# Patient Record
Sex: FEMALE | Race: ASIAN | NOT HISPANIC OR LATINO | URBAN - METROPOLITAN AREA
[De-identification: names, ages, dates, MRNs, and addresses within clinical notes are randomized per-mention and may not be internally consistent; named-entity substitution may affect disease eponyms.]

---

## 2021-09-10 ENCOUNTER — EMERGENCY (EMERGENCY)
Facility: HOSPITAL | Age: 66
LOS: 1 days | Discharge: HOME | End: 2021-09-10
Attending: EMERGENCY MEDICINE
Payer: MEDICARE

## 2021-09-10 VITALS
HEIGHT: 64 IN | SYSTOLIC BLOOD PRESSURE: 189 MMHG | TEMPERATURE: 98 F | DIASTOLIC BLOOD PRESSURE: 118 MMHG | WEIGHT: 154.1 LBS | HEART RATE: 89 BPM | OXYGEN SATURATION: 98 % | RESPIRATION RATE: 18 BRPM

## 2021-09-10 PROCEDURE — 93010 ELECTROCARDIOGRAM REPORT: CPT

## 2021-09-10 PROCEDURE — 99053 MED SERV 10PM-8AM 24 HR FAC: CPT

## 2021-09-10 PROCEDURE — 99284 EMERGENCY DEPT VISIT MOD MDM: CPT

## 2021-09-11 VITALS
TEMPERATURE: 98 F | RESPIRATION RATE: 18 BRPM | DIASTOLIC BLOOD PRESSURE: 102 MMHG | OXYGEN SATURATION: 100 % | SYSTOLIC BLOOD PRESSURE: 176 MMHG | HEART RATE: 71 BPM

## 2021-09-11 PROCEDURE — 71045 X-RAY EXAM CHEST 1 VIEW: CPT

## 2021-09-11 PROCEDURE — 73030 X-RAY EXAM OF SHOULDER: CPT

## 2021-09-11 PROCEDURE — 99284 EMERGENCY DEPT VISIT MOD MDM: CPT | Mod: 25

## 2021-09-11 PROCEDURE — 71045 X-RAY EXAM CHEST 1 VIEW: CPT | Mod: 26

## 2021-09-11 PROCEDURE — 73030 X-RAY EXAM OF SHOULDER: CPT | Mod: 26,LT

## 2021-09-11 PROCEDURE — 73060 X-RAY EXAM OF HUMERUS: CPT

## 2021-09-11 PROCEDURE — 93005 ELECTROCARDIOGRAM TRACING: CPT

## 2021-09-11 PROCEDURE — 73060 X-RAY EXAM OF HUMERUS: CPT | Mod: 26,LT

## 2021-09-11 RX ORDER — TETANUS TOXOID, REDUCED DIPHTHERIA TOXOID AND ACELLULAR PERTUSSIS VACCINE, ADSORBED 5; 2.5; 8; 8; 2.5 [IU]/.5ML; [IU]/.5ML; UG/.5ML; UG/.5ML; UG/.5ML
0.5 SUSPENSION INTRAMUSCULAR ONCE
Refills: 0 | Status: DISCONTINUED | OUTPATIENT
Start: 2021-09-11 | End: 2021-09-14

## 2021-09-11 RX ORDER — ACETAMINOPHEN 500 MG
975 TABLET ORAL ONCE
Refills: 0 | Status: DISCONTINUED | OUTPATIENT
Start: 2021-09-11 | End: 2021-09-14

## 2021-09-11 RX ORDER — OXYCODONE HYDROCHLORIDE 5 MG/1
10 TABLET ORAL ONCE
Refills: 0 | Status: DISCONTINUED | OUTPATIENT
Start: 2021-09-11 | End: 2021-09-11

## 2021-09-11 RX ADMIN — OXYCODONE HYDROCHLORIDE 10 MILLIGRAM(S): 5 TABLET ORAL at 01:32

## 2021-09-11 NOTE — ED PROVIDER NOTE - NSFOLLOWUPINSTRUCTIONS_ED_ALL_ED_FT
You were seen in the Emergency Department today for a left shoulder dislocation. X-ray imaging confirmed this diagnosis. You were provided pain management with Tylenol and Oxycodone. There was a spontaneous reduction of you left shoulder, no procedure was performed. Your arm was placed in a sling and repeat x-rays confirmed appropriate shoulder reduction. It is recommended that you see an Orthopedic Surgeon for follow-up care and to evaluate for a possible ligamentous injury. A list of providers have been provided to you.     Please follow with your primary care provider. Take home medication as prescribed.     Please return to the Emergency Department if you experience re-dislocation of your shoulder or any of the following symptoms: severe pain, fever, chills, chest pain, palpitations, nausea, vomiting, or any other alarming symptoms.      Your shoulder joint is made up of 3 bones:    The upper arm bone (humerus).  The shoulder blade (scapula).  The collarbone (clavicle).    A shoulder dislocation happens when your upper arm bone moves out of its normal place in your shoulder joint.      Follow these instructions at home:  If you have a splint or sling:    Wear it as told by your doctor.  Take it off only as told by your doctor.  Loosen it if:  Your fingers become numb and tingly.  Your fingers turn cold and blue.  Keep it clean and dry.    Bathing    Do not take baths, swim, or use a hot tub until your doctor says you can. Ask your doctor if you can take showers. You may only be allowed to take sponge baths.  If your doctor says taking baths or showers is okay, cover your splint or sling with a plastic bag. Do not let the splint or sling get wet.    Managing pain, stiffness, and swelling    If told, put ice on the injured area.  Put ice in a plastic bag.  Place a towel between your skin and the bag.  Leave the ice on for 20 minutes, 2–3 times per day.  Move your fingers often to avoid stiffness and to lessen swelling.  Raise (elevate) the injured area above the level of your heart while you are sitting or lying down.     Driving    Do not drive while you are wearing a splint or sling on a hand that you use for driving.  Do not drive or operate heavy machinery while taking pain medicine.    Activity    Return to your normal activities as told by your doctor. Ask your doctor what activities are safe for you.  Do range-of-motion exercises only as told by your doctor.  Exercise your hand by squeezing a soft ball. This keeps your hand and wrist from getting stiff and swollen.    General instructions    Take over-the-counter and prescription medicines only as told by your doctor.  Do not use any tobacco products, including cigarettes, chewing tobacco, or e-cigarettes. Tobacco can slow down healing. If you need help quitting, ask your doctor.  Keep all follow-up visits as told by your doctor. This is important.    Contact a doctor if:  Your splint or sling gets damaged.    Get help right away if:  Your pain gets worse instead of better.  You lose feeling in your arm or hand.  Your arm or hand turns white and cold.    ADDITIONAL NOTES AND INSTRUCTIONS    Please follow up with your Primary MD in 24-48 hr.  Seek immediate medical care for any new/worsening signs or symptoms.

## 2021-09-11 NOTE — ED PROVIDER NOTE - ATTENDING CONTRIBUTION TO CARE
Attending note (Adam):  67 y/o F with h/o borderline HLD, c/o left shoulder pain after falling down 3 steps; L shoulder deformity - suspect proximal humerus fracture vs dislocation; will obtain xrays, offer pain medication; lip laceration will need primary closure, offer tetanus vaccination.    Addendum: spontaneously reduced shoulder. declined suturing of lip laceration (will offer steri strip but educated patient on delayed healing, likely worse cosmesis; patient verbalized understanding); stable for dc with recommendation to see ortho as outpatient.

## 2021-09-11 NOTE — ED ADULT NURSE NOTE - NSIMPLEMENTINTERV_GEN_ALL_ED
Implemented All Fall Risk Interventions:  [place] to call system. Call bell, personal items and telephone within reach. Instruct patient to call for assistance. Room bathroom lighting operational. Non-slip footwear when patient is off stretcher. Physically safe environment: no spills, clutter or unnecessary equipment. Stretcher in lowest position, wheels locked, appropriate side rails in place. Provide visual cue, wrist band, yellow gown, etc. Monitor gait and stability. Monitor for mental status changes and reorient to person, place, and time. Review medications for side effects contributing to fall risk. Reinforce activity limits and safety measures with patient and family.

## 2021-09-11 NOTE — ED PROVIDER NOTE - NS ED ROS FT
Review of Systems:  -General: no fever  -ENT: no congestion  -Pulmonary: no cough, no shortness of breath  -Cardiac: no chest pain  -Gastrointestinal: no abdominal pain  -Musculoskeletal: no back or neck pain; + L shoulder pain; + chronic knee pain (unchagned);  denies hip pain.  -Skin: no rashes  -Endocrine: No h/o diabetes or thyroid disease

## 2021-09-11 NOTE — ED PROVIDER NOTE - PHYSICAL EXAMINATION
On Physical Exam:  General: well appearing, in NAD, speaking clearly in full sentences and without difficulty; cooperative with exam  HEENT: airway patent  Neck: no neck tenderness, no nuchal rigidity  Cardiac: normal s1, s2; RRR; no MGR  Lungs: CTABL  Abdomen: soft nontender/nondistended  Skin: lip laceration to lower lip / left side, < 0.5cm, superficial, extending to just at the vermillion border (vertical lac)  Extremities:   -RUE: no gross deformities, nontender, FROM, radial pulse 2+  -LUE: obvious deformity to the left shoulder; limited ROM of shoulder due to pain; elbow, wrist/hand nontender no deformity; radial pulse 2+  -hips nontender, ambulating normally without difficulty  Neuro: no gross neurologic deficits; full sensation throughout left upper extremity

## 2021-09-11 NOTE — ED ADULT NURSE NOTE - OBJECTIVE STATEMENT
66y female, AAOx3, denies PMH, complaining of 10/10 pain to left shoulder and arm, pt reports tripping up stairs, no LOC, fell on left arm, constant pain since then, also reports lip LAC, no headache, dizziness, changes in vision, moves all extremities w/+ pulses, bed in lowest position, comfort and safety provided.

## 2021-09-11 NOTE — ED PROVIDER NOTE - TOBACCO USE
Never smoker
You can access the [employer] Patient Portal, offered by [facility], by registering with the following website: [email or web address]
No

## 2021-09-11 NOTE — ED PROVIDER NOTE - PROGRESS NOTE DETAILS
Attending note (Adam): patient went to restroom in preparation for procedural sedation to reduce dislocated shoulder and reports feeling pop when using L UE to pull up pants, now has no pain; on repeat exam, shoulder appears without deformity. Suspect spontaneous reduction; will obtain xrays to confirm. Mitch PGY 1: Spontaneous reduction of left shoulder when patient went to bathroom and lifted her pants up. Will get repeat x-ray. Recommended to patient that she follow-up w/ Ortho to evaluate for ligamentous injury to left shoulder. Cleaned punctuate wound on lower lip. Per our examination, laceration requires 1-2 sutures for closure. Informed patient and she declines suture placement. Verbalized the risks of not repairing laceration in ED including infection. Patient states she understands and still declines suture placement. Mitch PGY 1: Spontaneous reduction of left shoulder when patient went to bathroom and lifted her pants up. Will get repeat x-ray. Recommended to patient that she follow-up w/ Ortho to evaluate for ligamentous injury to left shoulder. Cleaned punctuate wound on lower lip. Per our examination, laceration requires 1-2 sutures for closure. Informed patient and she declines suture placement. Verbalized the risks of not repairing laceration in ED including infection. Patient states she understands and still declines suture placement. Return precautions discussed. Steri-strip placed on lac.

## 2021-09-11 NOTE — ED PROVIDER NOTE - PATIENT PORTAL LINK FT
You can access the FollowMyHealth Patient Portal offered by [facility] by registering at the following website: [email or web address]. By joining [employer]’s FollowMyHealth portal, you will also be able to view your health information using other applications (apps) compatible with our system.

## 2021-09-11 NOTE — ED PROVIDER NOTE - OBJECTIVE STATEMENT
Attending note (Adam): 67 y/o F with h/o borderline HLD, c/o left shoulder pain after falling down 3 steps: Denies head trauma. Pt states fell onto left side; c/o pain in the left shoulder; improved with holding forearm up.  After fall was able to get up, walked back up stairs and found , who brought her to ED for evaluation.  unknown last tetanus vaccination.    PMH: HLD  Meds: none; denies ASA/AC use  Allergies: NKDA  Nonsmoker

## 2021-09-11 NOTE — ED PROVIDER NOTE - CLINICAL SUMMARY MEDICAL DECISION MAKING FREE TEXT BOX
Attending note (Adam):  65 y/o F with h/o borderline HLD, c/o left shoulder pain after falling down 3 steps; L shoulder deformity - suspect proximal humerus fracture vs dislocation; will obtain xrays, offer pain medication; lip laceration will need primary closure, offer tetanus vaccination Attending note (Adam):  65 y/o F with h/o borderline HLD, c/o left shoulder pain after falling down 3 steps; L shoulder deformity - suspect proximal humerus fracture vs dislocation; will obtain xrays, offer pain medication; lip laceration will need primary closure, offer tetanus vaccination    Mitch PGY1: 65 y/o who PTED after fall down 3 steps. Denies head injury or LOC. C/o Left shoulder pain. Concern for L shoulder dislocation vs humerus fracture. small punctuate on lower  lip laceration near left vermillion border. Xray of shoulder and humerus. chest xray. Pain control with oxy and tylenol. Unknown tetanus. Will repair lip lac.

## 2022-03-14 ENCOUNTER — EMERGENCY (EMERGENCY)
Facility: HOSPITAL | Age: 67
LOS: 1 days | Discharge: HOME | End: 2022-03-14
Attending: EMERGENCY MEDICINE | Admitting: EMERGENCY MEDICINE
Payer: MEDICARE

## 2022-03-14 VITALS
HEART RATE: 100 BPM | DIASTOLIC BLOOD PRESSURE: 89 MMHG | RESPIRATION RATE: 18 BRPM | TEMPERATURE: 98 F | OXYGEN SATURATION: 100 % | HEIGHT: 64 IN | SYSTOLIC BLOOD PRESSURE: 167 MMHG

## 2022-03-14 VITALS
DIASTOLIC BLOOD PRESSURE: 87 MMHG | HEART RATE: 76 BPM | TEMPERATURE: 98 F | RESPIRATION RATE: 16 BRPM | SYSTOLIC BLOOD PRESSURE: 163 MMHG | OXYGEN SATURATION: 100 %

## 2022-03-14 LAB
ALBUMIN SERPL ELPH-MCNC: 4.7 G/DL — SIGNIFICANT CHANGE UP (ref 3.3–5)
ALP SERPL-CCNC: 118 U/L — SIGNIFICANT CHANGE UP (ref 40–120)
ALT FLD-CCNC: 10 U/L — SIGNIFICANT CHANGE UP (ref 4–33)
ANION GAP SERPL CALC-SCNC: 12 MMOL/L — SIGNIFICANT CHANGE UP (ref 7–14)
APPEARANCE UR: CLEAR — SIGNIFICANT CHANGE UP
AST SERPL-CCNC: 16 U/L — SIGNIFICANT CHANGE UP (ref 4–32)
BASOPHILS # BLD AUTO: 0.05 K/UL — SIGNIFICANT CHANGE UP (ref 0–0.2)
BASOPHILS NFR BLD AUTO: 1.1 % — SIGNIFICANT CHANGE UP (ref 0–2)
BILIRUB SERPL-MCNC: 0.4 MG/DL — SIGNIFICANT CHANGE UP (ref 0.2–1.2)
BILIRUB UR-MCNC: NEGATIVE — SIGNIFICANT CHANGE UP
BUN SERPL-MCNC: 20 MG/DL — SIGNIFICANT CHANGE UP (ref 7–23)
CALCIUM SERPL-MCNC: 9.9 MG/DL — SIGNIFICANT CHANGE UP (ref 8.4–10.5)
CHLORIDE SERPL-SCNC: 104 MMOL/L — SIGNIFICANT CHANGE UP (ref 98–107)
CO2 SERPL-SCNC: 26 MMOL/L — SIGNIFICANT CHANGE UP (ref 22–31)
COLOR SPEC: YELLOW — SIGNIFICANT CHANGE UP
CREAT SERPL-MCNC: 0.96 MG/DL — SIGNIFICANT CHANGE UP (ref 0.5–1.3)
DIFF PNL FLD: NEGATIVE — SIGNIFICANT CHANGE UP
EGFR: 64 ML/MIN/1.73M2 — SIGNIFICANT CHANGE UP
EOSINOPHIL # BLD AUTO: 0.32 K/UL — SIGNIFICANT CHANGE UP (ref 0–0.5)
EOSINOPHIL NFR BLD AUTO: 6.8 % — HIGH (ref 0–6)
GLUCOSE SERPL-MCNC: 80 MG/DL — SIGNIFICANT CHANGE UP (ref 70–99)
GLUCOSE UR QL: NEGATIVE — SIGNIFICANT CHANGE UP
HCT VFR BLD CALC: 39.1 % — SIGNIFICANT CHANGE UP (ref 34.5–45)
HGB BLD-MCNC: 12.9 G/DL — SIGNIFICANT CHANGE UP (ref 11.5–15.5)
IANC: 2.5 K/UL — SIGNIFICANT CHANGE UP (ref 1.5–8.5)
IMM GRANULOCYTES NFR BLD AUTO: 0.4 % — SIGNIFICANT CHANGE UP (ref 0–1.5)
KETONES UR-MCNC: NEGATIVE — SIGNIFICANT CHANGE UP
LEUKOCYTE ESTERASE UR-ACNC: ABNORMAL
LIDOCAIN IGE QN: 35 U/L — SIGNIFICANT CHANGE UP (ref 7–60)
LYMPHOCYTES # BLD AUTO: 1.47 K/UL — SIGNIFICANT CHANGE UP (ref 1–3.3)
LYMPHOCYTES # BLD AUTO: 31 % — SIGNIFICANT CHANGE UP (ref 13–44)
MCHC RBC-ENTMCNC: 30.1 PG — SIGNIFICANT CHANGE UP (ref 27–34)
MCHC RBC-ENTMCNC: 33 GM/DL — SIGNIFICANT CHANGE UP (ref 32–36)
MCV RBC AUTO: 91.1 FL — SIGNIFICANT CHANGE UP (ref 80–100)
MONOCYTES # BLD AUTO: 0.38 K/UL — SIGNIFICANT CHANGE UP (ref 0–0.9)
MONOCYTES NFR BLD AUTO: 8 % — SIGNIFICANT CHANGE UP (ref 2–14)
NEUTROPHILS # BLD AUTO: 2.5 K/UL — SIGNIFICANT CHANGE UP (ref 1.8–7.4)
NEUTROPHILS NFR BLD AUTO: 52.7 % — SIGNIFICANT CHANGE UP (ref 43–77)
NITRITE UR-MCNC: NEGATIVE — SIGNIFICANT CHANGE UP
NRBC # BLD: 0 /100 WBCS — SIGNIFICANT CHANGE UP
NRBC # FLD: 0 K/UL — SIGNIFICANT CHANGE UP
PH UR: 5.5 — SIGNIFICANT CHANGE UP (ref 5–8)
PLATELET # BLD AUTO: 290 K/UL — SIGNIFICANT CHANGE UP (ref 150–400)
POTASSIUM SERPL-MCNC: 4.4 MMOL/L — SIGNIFICANT CHANGE UP (ref 3.5–5.3)
POTASSIUM SERPL-SCNC: 4.4 MMOL/L — SIGNIFICANT CHANGE UP (ref 3.5–5.3)
PROT SERPL-MCNC: 7.8 G/DL — SIGNIFICANT CHANGE UP (ref 6–8.3)
PROT UR-MCNC: ABNORMAL
RBC # BLD: 4.29 M/UL — SIGNIFICANT CHANGE UP (ref 3.8–5.2)
RBC # FLD: 13.7 % — SIGNIFICANT CHANGE UP (ref 10.3–14.5)
SODIUM SERPL-SCNC: 142 MMOL/L — SIGNIFICANT CHANGE UP (ref 135–145)
SP GR SPEC: 1.03 — SIGNIFICANT CHANGE UP (ref 1–1.05)
UROBILINOGEN FLD QL: SIGNIFICANT CHANGE UP
WBC # BLD: 4.74 K/UL — SIGNIFICANT CHANGE UP (ref 3.8–10.5)
WBC # FLD AUTO: 4.74 K/UL — SIGNIFICANT CHANGE UP (ref 3.8–10.5)

## 2022-03-14 PROCEDURE — 99284 EMERGENCY DEPT VISIT MOD MDM: CPT

## 2022-03-14 PROCEDURE — 71046 X-RAY EXAM CHEST 2 VIEWS: CPT | Mod: 26

## 2022-03-14 PROCEDURE — 74018 RADEX ABDOMEN 1 VIEW: CPT | Mod: 26

## 2022-03-14 RX ORDER — ACETAMINOPHEN 500 MG
975 TABLET ORAL ONCE
Refills: 0 | Status: COMPLETED | OUTPATIENT
Start: 2022-03-14 | End: 2022-03-14

## 2022-03-14 RX ADMIN — Medication 975 MILLIGRAM(S): at 09:37

## 2022-03-14 RX ADMIN — Medication 30 MILLILITER(S): at 09:37

## 2022-03-14 NOTE — ED PROVIDER NOTE - PROGRESS NOTE DETAILS
Pt was reassessed at this time and labs and imaging reviewed. Labs are unactionable. No leukocytosis, Lytes are unremarkable. UA neg for infection. XR abd is normal per rads. CXR is normal per my read except for some likely age related degeneration of her spine but no acute fx and pt has no back pain. Abd pain likely gas vs constipation. Will discharge at this time to have pt f/u with PMD.

## 2022-03-14 NOTE — ED PROVIDER NOTE - OBJECTIVE STATEMENT
68 yo F with HLD that presents with abd pain. Abd pain, left sided but diffuse, described as discomfort, dull/achy. Started 2 days ago, continues, intermittent. Has not had any surgery in abd/pelv in life, no associated symptoms including NO N/V/D, fever, chills, cough, urinary problems, BM problems. NO trauma, no rashes. Pt has been otherwise able to eat and drink normally. She took Advil 200 mg daily x 2 days with relief of pain but the pain comes back so she came to ED for eval. Pt does state that when she drank hot liquid the other day she had more belching and gas than normal but otherwise no issues usually.

## 2022-03-14 NOTE — ED ADULT NURSE NOTE - NSIMPLEMENTINTERV_GEN_ALL_ED
Pt here for pe/pap.   LMP:12/31/20  Last pap:unable to recall   Last mammogram;8/29/16 negative   Birth control:Vasectomy Implemented All Universal Safety Interventions:  [place] to call system. Call bell, personal items and telephone within reach. Instruct patient to call for assistance. Room bathroom lighting operational. Non-slip footwear when patient is off stretcher. Physically safe environment: no spills, clutter or unnecessary equipment. Stretcher in lowest position, wheels locked, appropriate side rails in place.

## 2022-03-14 NOTE — ED ADULT NURSE NOTE - OBJECTIVE STATEMENT
A&Ox4, PMHx HLD on simvastatin, c/o diffuse abdominal pain since Saturday but denies n/v/d, afebrile. Respirations are even and unlabored, sating at 100% on RA, VSS, 20 G to R AC placed, labs sent. Assessment ongoing.

## 2022-03-14 NOTE — ED ADULT TRIAGE NOTE - CHIEF COMPLAINT QUOTE
pt c/o intermittent generalized abd pain x 3 days. denies n/v/d/fever/chills. reports partial relief with 200mg of motrin.

## 2022-03-14 NOTE — ED PROVIDER NOTE - PATIENT PORTAL LINK FT
You can access the FollowMyHealth Patient Portal offered by [facility] by registering at the following website: [email or web address]. By joining [employer]’s FollowMyHealth portal, you will also be able to view your health information using other applications (apps) compatible with our system.

## 2022-03-14 NOTE — ED PROVIDER NOTE - CLINICAL SUMMARY MEDICAL DECISION MAKING FREE TEXT BOX
68 yo F with HLD that presents with diffuse abd discomfort x 2 days which is temporarily relieved by advil x 2 days. Pt has benign exam, abd is soft nontender, no masses, no CVAT. Pt denies fever, chills, N/V/D, and HAS been eating normally since onset pain. NO urinary symptoms and no rash found on exam. May be early shingles but pt has no lesions now. Clinically, pt has no indication for CT imaging at this time. Will obtain labs to check lytes and lipase and LFTs but will provide medications including simethicone which can treat gas pains and reassess but likely discharge home to f/u with PMD which she has not done yet.

## 2022-03-15 LAB
CULTURE RESULTS: SIGNIFICANT CHANGE UP
SPECIMEN SOURCE: SIGNIFICANT CHANGE UP

## 2023-05-01 NOTE — ED ADULT NURSE NOTE - ISOLATION TYPE:
nAh and Liza make my life worth living.  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  None

## 2023-06-08 NOTE — ED ADULT NURSE NOTE - NSSUHOSCREENINGYN_ED_ALL_ED
Pt willingly accepted PO and demonstrated functional labial grading on utensils. No dribbling noted. Yes - the patient is able to be screened

## 2025-02-12 NOTE — ED ADULT NURSE NOTE - HOW OFTEN DO YOU HAVE A DRINK CONTAINING ALCOHOL?
[No Acute Distress] : no acute distress [Well Nourished] : well nourished [Well Developed] : well developed [Well-Appearing] : well-appearing [Normal Sclera/Conjunctiva] : normal sclera/conjunctiva [PERRL] : pupils equal round and reactive to light [EOMI] : extraocular movements intact [Normal Outer Ear/Nose] : the outer ears and nose were normal in appearance [Normal Oropharynx] : the oropharynx was normal [No JVD] : no jugular venous distention [No Lymphadenopathy] : no lymphadenopathy [Supple] : supple [Thyroid Normal, No Nodules] : the thyroid was normal and there were no nodules present [No Respiratory Distress] : no respiratory distress  [No Accessory Muscle Use] : no accessory muscle use [Clear to Auscultation] : lungs were clear to auscultation bilaterally Never [Normal Rate] : normal rate  [Regular Rhythm] : with a regular rhythm [Normal S1, S2] : normal S1 and S2 [No Murmur] : no murmur heard [No Carotid Bruits] : no carotid bruits [No Abdominal Bruit] : a ~M bruit was not heard ~T in the abdomen [No Varicosities] : no varicosities [Pedal Pulses Present] : the pedal pulses are present [No Edema] : there was no peripheral edema [No Palpable Aorta] : no palpable aorta [No Extremity Clubbing/Cyanosis] : no extremity clubbing/cyanosis [Soft] : abdomen soft [Non Tender] : non-tender [Non-distended] : non-distended [No Masses] : no abdominal mass palpated [No HSM] : no HSM [Normal Bowel Sounds] : normal bowel sounds [Normal Posterior Cervical Nodes] : no posterior cervical lymphadenopathy [Normal Anterior Cervical Nodes] : no anterior cervical lymphadenopathy [No CVA Tenderness] : no CVA  tenderness [No Spinal Tenderness] : no spinal tenderness [No Joint Swelling] : no joint swelling [Grossly Normal Strength/Tone] : grossly normal strength/tone [No Rash] : no rash [Coordination Grossly Intact] : coordination grossly intact [No Focal Deficits] : no focal deficits [Normal Gait] : normal gait [Deep Tendon Reflexes (DTR)] : deep tendon reflexes were 2+ and symmetric [Normal Affect] : the affect was normal [Normal Insight/Judgement] : insight and judgment were intact